# Patient Record
(demographics unavailable — no encounter records)

---

## 2025-02-18 NOTE — PHYSICAL EXAM
[General Appearance - Well Developed] : well developed [Normal Appearance] : normal appearance [Well Groomed] : well groomed [General Appearance - Well Nourished] : well nourished [No Deformities] : no deformities [General Appearance - In No Acute Distress] : no acute distress [Normal Conjunctiva] : the conjunctiva exhibited no abnormalities [Eyelids - No Xanthelasma] : the eyelids demonstrated no xanthelasmas [Normal Oral Mucosa] : normal oral mucosa [No Oral Pallor] : no oral pallor [No Oral Cyanosis] : no oral cyanosis [Normal Jugular Venous A Waves Present] : normal jugular venous A waves present [Normal Jugular Venous V Waves Present] : normal jugular venous V waves present [No Jugular Venous Regalado A Waves] : no jugular venous regalado A waves [Respiration, Rhythm And Depth] : normal respiratory rhythm and effort [Exaggerated Use Of Accessory Muscles For Inspiration] : no accessory muscle use [Auscultation Breath Sounds / Voice Sounds] : lungs were clear to auscultation bilaterally [Heart Rate And Rhythm] : heart rate and rhythm were normal [Heart Sounds] : normal S1 and S2 [Murmurs] : no murmurs present [Abdomen Soft] : soft [Abdomen Tenderness] : non-tender [Abdomen Mass (___ Cm)] : no abdominal mass palpated [Abnormal Walk] : normal gait [Gait - Sufficient For Exercise Testing] : the gait was sufficient for exercise testing [Nail Clubbing] : no clubbing of the fingernails [Cyanosis, Localized] : no localized cyanosis [Petechial Hemorrhages (___cm)] : no petechial hemorrhages [Skin Color & Pigmentation] : normal skin color and pigmentation [] : no rash [No Venous Stasis] : no venous stasis [Skin Lesions] : no skin lesions [No Skin Ulcers] : no skin ulcer [No Xanthoma] : no  xanthoma was observed [Oriented To Time, Place, And Person] : oriented to person, place, and time [Affect] : the affect was normal [Mood] : the mood was normal [No Anxiety] : not feeling anxious [FreeTextEntry1] : Obese

## 2025-02-18 NOTE — ASSESSMENT
[FreeTextEntry1] : Shortness of breath on more than usual exertion -multiple CAD risk factor including hypertension, dyslipidemia; he also has erectile dysfunction; I recommend echocardiography for LV size, LV thickness, wall motion, LVEF and valve morphology.  I also recommended exercise nuclear stress test to see inducible ischemia and evaluation of symptoms.  Obesity -weight reduction with diet exercise  Sleep apnea syndrome -compliance with CPAP machine  Hypertension, well controlled in office-low-salt diet, continue medications, BP monitoring, he has been advised to take blood pressure in the morning evening and whenever he has headache for correlation.  Compliance with CPAP has stressed upon him low-salt diet has been stressed upon him  Dyslipidemia -low cholesterol diet, continue medications.  Lipid panel and BMP has been requested.  Hypogonadism -he is on testosterone supplement; CBC monitoring  Decreased carotid upstroke -Doppler has been recommended.  Aggressive risk factor modification has been discussed with the medical by me include regular walking, palpitation, low-cholesterol diet.  He will be reevaluated by me after cardiac testing..  Clinically he has no evidence of ACS or CAD.

## 2025-02-18 NOTE — HISTORY OF PRESENT ILLNESS
[FreeTextEntry1] : 66 -year-old gentleman with history of hypertension, dyslipidemia, erectile dysfunction, obesity, sleep apnea syndrome came for cardiac follow-up .  Blood pressure is well controlled and he does not get headache anymore.  He is scheduled for treatment for prostate cancer, he does follow with urologist  He gets short of breath on more than usual exertion.  He denies any PND, orthopnea, diaphoresis, dizziness, palpitation, pedal edema.  He had echocardiogram done by his PMD which showed normal size, normal virtual, LVEF.  He offers no new symptoms.  He also has been diagnosed of hypogonadism and has been on testosterone supplement.  No prior to CHF, MI, syncope

## 2025-04-02 NOTE — ASSESSMENT
[FreeTextEntry1] : Shortness of breath on more than usual exertion -multiple CAD risk factor including hypertension, dyslipidemia; he also has erectile dysfunction; echo confirmed normal LV size, LV thickness, wall motion, LVEF and valve morphology.  Exercise nuclear stress test did not show inducible ischemia.  Obesity -weight reduction with diet exercise  Sleep apnea syndrome -compliance with CPAP machine  Hypertension, well controlled in office-low-salt diet, continue medications, BP monitoring, he has been advised to take blood pressure in the morning evening and whenever he has headache for correlation.  Compliance with CPAP has stressed upon him low-salt diet has been stressed upon him  Dyslipidemia -low cholesterol diet, continue medications.  Lipid panel and BMP has been requested. Last LDL 98; as per my note he was not taking Zetia ; i sent script ; lipid panel in 6 weeks  Hypogonadism -he is on testosterone supplement; CBC monitoring  Decreased carotid upstroke -Doppler  showed mild stenosis  Aggressive risk factor modification has been discussed with the medical by me include regular walking, palpitation, low-cholesterol diet.  He will be reevaluated by me after cardiac testing..  Clinically he has no evidence of ACS or CAD. OV 6 months

## 2025-04-02 NOTE — HISTORY OF PRESENT ILLNESS
[FreeTextEntry1] : 66 -year-old gentleman with history of hypertension, dyslipidemia, erectile dysfunction, obesity, sleep apnea syndrome came for cardiac follow-up .  Today he is here for cardiac testing follow-up.  He offers no new symptoms.  February 26,2025     Carotid Doppler  showed mild stenosis February 26, 2025    echo confirmed normal LV size, LV thickness, and wall motion, LV 60% without signal valvular abnormality, PASP 15 mmHg. February 27, 2025    nuclear stress test was done using Yifan protocol; exercised for  7.5 minutes with peak heart rate 137 bpm, peak blood pressure 184/40 mmHg.  No EKGs, no symptoms, exercise Duke treadmill score 7.5 which is low risk score.  He had complete normal Myocard perfusion scan.  Blood pressure is well controlled and he does not get headache anymore.  He is scheduled for treatment for prostate cancer, he does follow with urologist  He gets short of breath on more than usual exertion.  He denies any PND, orthopnea, diaphoresis, dizziness, palpitation, pedal edema.  He had echocardiogram done by his PMD which showed normal size, normal virtual, LVEF.  He offers no new symptoms.  He also has been diagnosed of hypogonadism and has been on testosterone supplement.  No prior to CHF, MI, syncope  January 29, 2025    LDL 98, CMP normal